# Patient Record
Sex: FEMALE
[De-identification: names, ages, dates, MRNs, and addresses within clinical notes are randomized per-mention and may not be internally consistent; named-entity substitution may affect disease eponyms.]

---

## 2021-11-04 PROBLEM — Z00.00 ENCOUNTER FOR PREVENTIVE HEALTH EXAMINATION: Status: ACTIVE | Noted: 2021-11-04

## 2021-11-24 PROBLEM — Z00.00 ENCOUNTER FOR PREVENTIVE HEALTH EXAMINATION: Status: ACTIVE | Noted: 2021-11-24

## 2021-12-06 ENCOUNTER — TRANSCRIPTION ENCOUNTER (OUTPATIENT)
Age: 61
End: 2021-12-06

## 2021-12-06 ENCOUNTER — APPOINTMENT (OUTPATIENT)
Dept: OTOLARYNGOLOGY | Facility: CLINIC | Age: 61
End: 2021-12-06
Payer: COMMERCIAL

## 2021-12-06 VITALS
DIASTOLIC BLOOD PRESSURE: 67 MMHG | OXYGEN SATURATION: 100 % | SYSTOLIC BLOOD PRESSURE: 106 MMHG | TEMPERATURE: 97.7 F | HEART RATE: 66 BPM | BODY MASS INDEX: 17.72 KG/M2 | HEIGHT: 63 IN | WEIGHT: 100 LBS

## 2021-12-06 DIAGNOSIS — G58.9 MONONEUROPATHY, UNSPECIFIED: ICD-10-CM

## 2021-12-06 DIAGNOSIS — M62.81 MUSCLE WEAKNESS (GENERALIZED): ICD-10-CM

## 2021-12-06 DIAGNOSIS — E55.9 VITAMIN D DEFICIENCY, UNSPECIFIED: ICD-10-CM

## 2021-12-06 DIAGNOSIS — Z87.09 PERSONAL HISTORY OF OTHER DISEASES OF THE RESPIRATORY SYSTEM: ICD-10-CM

## 2021-12-06 DIAGNOSIS — Z80.9 FAMILY HISTORY OF MALIGNANT NEOPLASM, UNSPECIFIED: ICD-10-CM

## 2021-12-06 DIAGNOSIS — Z78.9 OTHER SPECIFIED HEALTH STATUS: ICD-10-CM

## 2021-12-06 DIAGNOSIS — E87.1 HYPO-OSMOLALITY AND HYPONATREMIA: ICD-10-CM

## 2021-12-06 DIAGNOSIS — Z86.39 PERSONAL HISTORY OF OTHER ENDOCRINE, NUTRITIONAL AND METABOLIC DISEASE: ICD-10-CM

## 2021-12-06 DIAGNOSIS — Z82.49 FAMILY HISTORY OF ISCHEMIC HEART DISEASE AND OTHER DISEASES OF THE CIRCULATORY SYSTEM: ICD-10-CM

## 2021-12-06 DIAGNOSIS — Z83.3 FAMILY HISTORY OF DIABETES MELLITUS: ICD-10-CM

## 2021-12-06 PROCEDURE — 31575 DIAGNOSTIC LARYNGOSCOPY: CPT

## 2021-12-06 PROCEDURE — 99205 OFFICE O/P NEW HI 60 MIN: CPT | Mod: 25

## 2021-12-06 PROCEDURE — 76536 US EXAM OF HEAD AND NECK: CPT

## 2021-12-06 RX ORDER — ESTRADIOL 0.05 MG/D
0.05 PATCH TRANSDERMAL
Refills: 0 | Status: ACTIVE | COMMUNITY

## 2021-12-06 RX ORDER — TESTOSTERONE 25 MG/2.5G
25 GEL TRANSDERMAL
Refills: 0 | Status: ACTIVE | COMMUNITY

## 2021-12-07 LAB
25(OH)D3 SERPL-MCNC: 49 NG/ML
ALBUMIN SERPL ELPH-MCNC: 4.7 G/DL
ALP BLD-CCNC: 75 U/L
ALT SERPL-CCNC: 13 U/L
ANION GAP SERPL CALC-SCNC: 12 MMOL/L
AST SERPL-CCNC: 16 U/L
BILIRUB SERPL-MCNC: 0.2 MG/DL
BUN SERPL-MCNC: 18 MG/DL
CA-I SERPL-SCNC: 1.3 MMOL/L
CALCIUM SERPL-MCNC: 10.2 MG/DL
CALCIUM SERPL-MCNC: 10.2 MG/DL
CHLORIDE SERPL-SCNC: 102 MMOL/L
CO2 SERPL-SCNC: 25 MMOL/L
CREAT SERPL-MCNC: 0.62 MG/DL
GLUCOSE SERPL-MCNC: 77 MG/DL
PARATHYROID HORMONE INTACT: 70 PG/ML
POTASSIUM SERPL-SCNC: 4.8 MMOL/L
PROT SERPL-MCNC: 7 G/DL
SODIUM SERPL-SCNC: 139 MMOL/L
T4 FREE SERPL-MCNC: 0.9 NG/DL
TSH SERPL-ACNC: 1.3 UIU/ML

## 2021-12-13 DIAGNOSIS — E83.52 HYPERCALCEMIA: ICD-10-CM

## 2021-12-13 LAB
CAU: 9 MG/DL
CREAT 24H UR-MCNC: 0.8 G/24 H
CREAT 24H UR-MCNC: 0.8 G/24 H
CREAT ?TM UR-MCNC: 51 MG/DL
CREAT ?TM UR-MCNC: 51 MG/DL
PROT ?TM UR-MCNC: 24 HR
PROT ?TM UR-MCNC: 24 HR
SPECIMEN VOL 24H UR: 135 MG/24 H
SPECIMEN VOL 24H UR: 1500 ML
SPECIMEN VOL 24H UR: 1500 ML

## 2022-01-20 ENCOUNTER — APPOINTMENT (OUTPATIENT)
Dept: OTOLARYNGOLOGY | Facility: CLINIC | Age: 62
End: 2022-01-20
Payer: COMMERCIAL

## 2022-01-20 VITALS
RESPIRATION RATE: 17 BRPM | DIASTOLIC BLOOD PRESSURE: 75 MMHG | OXYGEN SATURATION: 97 % | TEMPERATURE: 97.7 F | SYSTOLIC BLOOD PRESSURE: 123 MMHG | BODY MASS INDEX: 17.72 KG/M2 | WEIGHT: 100 LBS | HEART RATE: 77 BPM | HEIGHT: 63 IN

## 2022-01-20 DIAGNOSIS — R79.89 OTHER SPECIFIED ABNORMAL FINDINGS OF BLOOD CHEMISTRY: ICD-10-CM

## 2022-01-20 DIAGNOSIS — N20.0 CALCULUS OF KIDNEY: ICD-10-CM

## 2022-01-20 PROCEDURE — 99215 OFFICE O/P EST HI 40 MIN: CPT | Mod: 25

## 2022-01-24 LAB
25(OH)D3 SERPL-MCNC: 52.3 NG/ML
ALBUMIN SERPL ELPH-MCNC: 5.1 G/DL
ALP BLD-CCNC: 89 U/L
ALT SERPL-CCNC: 22 U/L
ANION GAP SERPL CALC-SCNC: 15 MMOL/L
AST SERPL-CCNC: 24 U/L
BASOPHILS # BLD AUTO: 0.04 K/UL
BASOPHILS NFR BLD AUTO: 0.6 %
BILIRUB SERPL-MCNC: 0.2 MG/DL
BUN SERPL-MCNC: 17 MG/DL
CA-I SERPL-SCNC: 1.4 MMOL/L
CALCIUM SERPL-MCNC: 11 MG/DL
CALCIUM SERPL-MCNC: 11 MG/DL
CHLORIDE SERPL-SCNC: 103 MMOL/L
CO2 SERPL-SCNC: 25 MMOL/L
CREAT SERPL-MCNC: 0.71 MG/DL
EOSINOPHIL # BLD AUTO: 0.06 K/UL
EOSINOPHIL NFR BLD AUTO: 0.9 %
GLUCOSE SERPL-MCNC: 86 MG/DL
HCG SERPL-MCNC: 1 MIU/ML
HCT VFR BLD CALC: 44.3 %
HGB BLD-MCNC: 14 G/DL
IMM GRANULOCYTES NFR BLD AUTO: 0.1 %
INR PPP: 0.89 RATIO
LYMPHOCYTES # BLD AUTO: 1.86 K/UL
LYMPHOCYTES NFR BLD AUTO: 27.6 %
MAN DIFF?: NORMAL
MCHC RBC-ENTMCNC: 30.3 PG
MCHC RBC-ENTMCNC: 31.6 GM/DL
MCV RBC AUTO: 95.9 FL
MONOCYTES # BLD AUTO: 0.6 K/UL
MONOCYTES NFR BLD AUTO: 8.9 %
NEUTROPHILS # BLD AUTO: 4.16 K/UL
NEUTROPHILS NFR BLD AUTO: 61.9 %
PARATHYROID HORMONE INTACT: 46 PG/ML
PLATELET # BLD AUTO: 255 K/UL
POTASSIUM SERPL-SCNC: 5.6 MMOL/L
PROT SERPL-MCNC: 7.7 G/DL
PT BLD: 10.7 SEC
RBC # BLD: 4.62 M/UL
RBC # FLD: 13.4 %
SODIUM SERPL-SCNC: 143 MMOL/L
T3FREE SERPL-MCNC: 3.04 PG/ML
T4 FREE SERPL-MCNC: 1 NG/DL
THYROPEROXIDASE AB SERPL IA-ACNC: <10 IU/ML
TSH SERPL-ACNC: 1.89 UIU/ML
WBC # FLD AUTO: 6.73 K/UL

## 2022-01-30 LAB — SARS-COV-2 N GENE NPH QL NAA+PROBE: NOT DETECTED

## 2022-01-31 RX ORDER — ACETAMINOPHEN AND CODEINE 300; 30 MG/1; MG/1
300-30 TABLET ORAL
Qty: 18 | Refills: 0 | Status: ACTIVE | COMMUNITY
Start: 2022-01-31 | End: 1900-01-01

## 2022-01-31 RX ORDER — AZITHROMYCIN 500 MG/1
500 TABLET, FILM COATED ORAL DAILY
Qty: 1 | Refills: 0 | Status: ACTIVE | COMMUNITY
Start: 2022-01-31 | End: 1900-01-01

## 2022-02-01 ENCOUNTER — TRANSCRIPTION ENCOUNTER (OUTPATIENT)
Age: 62
End: 2022-02-01

## 2022-02-01 VITALS
SYSTOLIC BLOOD PRESSURE: 110 MMHG | HEART RATE: 73 BPM | DIASTOLIC BLOOD PRESSURE: 72 MMHG | TEMPERATURE: 97 F | OXYGEN SATURATION: 100 % | WEIGHT: 106.48 LBS | HEIGHT: 63.5 IN | RESPIRATION RATE: 16 BRPM

## 2022-02-01 NOTE — ASU PREOP CHECKLIST - 1.
K+ elevated in preop labs from1/21/2022 value 5.6  Dr. office informed and spoke with Kristina- no further orders given

## 2022-02-01 NOTE — ASU PATIENT PROFILE, ADULT - NSICDXPASTMEDICALHX_GEN_ALL_CORE_FT
PAST MEDICAL HISTORY:  Thyroid disease      PAST MEDICAL HISTORY:  History of pancreatitis     Kidney stone 2018    Thyroid disease      PAST MEDICAL HISTORY:  Asthma     History of pancreatitis     Kidney stone 2018    Thyroid disease

## 2022-02-01 NOTE — ASU PREOP CHECKLIST - AICD PRESENT
no Spiral Flap Text: The defect edges were debeveled with a #15c scalpel blade.  Given the location of the defect, shape of the defect and the proximity to free margins a spiral flap was deemed most appropriate.  Using a sterile surgical marker, an appropriate rotation flap was drawn incorporating the defect and placing the expected incisions within the relaxed skin tension lines where possible. The area thus outlined was incised deep to adipose tissue with a #15 scalpel blade.  The skin margins were undermined to an appropriate distance in all directions utilizing iris scissors.

## 2022-02-01 NOTE — ASU PATIENT PROFILE, ADULT - FALL HARM RISK - UNIVERSAL INTERVENTIONS
Bed in lowest position, wheels locked, appropriate side rails in place/Call bell, personal items and telephone in reach/Instruct patient to call for assistance before getting out of bed or chair/Non-slip footwear when patient is out of bed/[place] to call system/Physically safe environment - no spills, clutter or unnecessary equipment/Purposeful Proactive Rounding/Room/bathroom lighting operational, light cord in reach

## 2022-02-02 ENCOUNTER — APPOINTMENT (OUTPATIENT)
Dept: OTOLARYNGOLOGY | Facility: HOSPITAL | Age: 62
End: 2022-02-02

## 2022-02-02 ENCOUNTER — RESULT REVIEW (OUTPATIENT)
Age: 62
End: 2022-02-02

## 2022-02-02 ENCOUNTER — TRANSCRIPTION ENCOUNTER (OUTPATIENT)
Age: 62
End: 2022-02-02

## 2022-02-02 ENCOUNTER — OUTPATIENT (OUTPATIENT)
Dept: INPATIENT UNIT | Facility: HOSPITAL | Age: 62
LOS: 1 days | Discharge: HOME | End: 2022-02-02
Payer: COMMERCIAL

## 2022-02-02 VITALS — HEART RATE: 88 BPM | DIASTOLIC BLOOD PRESSURE: 62 MMHG | RESPIRATION RATE: 16 BRPM | SYSTOLIC BLOOD PRESSURE: 105 MMHG

## 2022-02-02 DIAGNOSIS — Z98.890 OTHER SPECIFIED POSTPROCEDURAL STATES: Chronic | ICD-10-CM

## 2022-02-02 DIAGNOSIS — Z90.710 ACQUIRED ABSENCE OF BOTH CERVIX AND UTERUS: Chronic | ICD-10-CM

## 2022-02-02 DIAGNOSIS — D18.00 HEMANGIOMA UNSPECIFIED SITE: Chronic | ICD-10-CM

## 2022-02-02 LAB
ALBUMIN SERPL ELPH-MCNC: 4.2 G/DL — SIGNIFICANT CHANGE UP (ref 3.3–5)
ALP SERPL-CCNC: 72 U/L — SIGNIFICANT CHANGE UP (ref 40–120)
ALT FLD-CCNC: 19 U/L — SIGNIFICANT CHANGE UP (ref 10–45)
ANION GAP SERPL CALC-SCNC: 17 MMOL/L — SIGNIFICANT CHANGE UP (ref 5–17)
AST SERPL-CCNC: 23 U/L — SIGNIFICANT CHANGE UP (ref 10–40)
BILIRUB SERPL-MCNC: 0.2 MG/DL — SIGNIFICANT CHANGE UP (ref 0.2–1.2)
BUN SERPL-MCNC: 12 MG/DL — SIGNIFICANT CHANGE UP (ref 7–23)
CALCIUM SERPL-MCNC: 9.4 MG/DL — SIGNIFICANT CHANGE UP (ref 8.4–10.5)
CHLORIDE SERPL-SCNC: 103 MMOL/L — SIGNIFICANT CHANGE UP (ref 96–108)
CO2 SERPL-SCNC: 21 MMOL/L — LOW (ref 22–31)
CREAT SERPL-MCNC: 0.67 MG/DL — SIGNIFICANT CHANGE UP (ref 0.5–1.3)
GLUCOSE SERPL-MCNC: 183 MG/DL — HIGH (ref 70–99)
ISTAT VENOUS BE: 3 MMOL/L — SIGNIFICANT CHANGE UP (ref -2–3)
ISTAT VENOUS GLUCOSE: 85 MG/DL — SIGNIFICANT CHANGE UP (ref 70–99)
ISTAT VENOUS HCO3: 29 MMOL/L — HIGH (ref 23–28)
ISTAT VENOUS HEMATOCRIT: 36 % — SIGNIFICANT CHANGE UP (ref 34.5–45)
ISTAT VENOUS HEMOGLOBIN: 12.2 GM/DL — SIGNIFICANT CHANGE UP (ref 11.5–15.5)
ISTAT VENOUS IONIZED CALCIUM: 1.33 MMOL/L — HIGH (ref 1.12–1.3)
ISTAT VENOUS PCO2: 46 MMHG — SIGNIFICANT CHANGE UP (ref 41–51)
ISTAT VENOUS PH: 7.4 — SIGNIFICANT CHANGE UP (ref 7.31–7.41)
ISTAT VENOUS PO2: <66 MMHG — LOW (ref 35–40)
ISTAT VENOUS POTASSIUM: 4.9 MMOL/L — SIGNIFICANT CHANGE UP (ref 3.5–5.3)
ISTAT VENOUS SO2: 25 % — SIGNIFICANT CHANGE UP
ISTAT VENOUS SODIUM: 140 MMOL/L — SIGNIFICANT CHANGE UP (ref 135–145)
ISTAT VENOUS TCO2: 30 MMOL/L — SIGNIFICANT CHANGE UP (ref 22–31)
MAGNESIUM SERPL-MCNC: 1.7 MG/DL — SIGNIFICANT CHANGE UP (ref 1.6–2.6)
PHOSPHATE SERPL-MCNC: 2.8 MG/DL — SIGNIFICANT CHANGE UP (ref 2.5–4.5)
POTASSIUM SERPL-MCNC: 4.5 MMOL/L — SIGNIFICANT CHANGE UP (ref 3.5–5.3)
POTASSIUM SERPL-SCNC: 4.5 MMOL/L — SIGNIFICANT CHANGE UP (ref 3.5–5.3)
PROT SERPL-MCNC: 6.3 G/DL — SIGNIFICANT CHANGE UP (ref 6–8.3)
PTH-INTACT IO % DIF SERPL: 18.1 PG/ML — SIGNIFICANT CHANGE UP (ref 15–65)
PTH-INTACT IO % DIF SERPL: 20.8 PG/ML — SIGNIFICANT CHANGE UP (ref 15–65)
PTH-INTACT IO % DIF SERPL: 27.6 PG/ML — SIGNIFICANT CHANGE UP (ref 15–65)
PTH-INTACT IO % DIF SERPL: 65.2 PG/ML — HIGH (ref 15–65)
SODIUM SERPL-SCNC: 141 MMOL/L — SIGNIFICANT CHANGE UP (ref 135–145)

## 2022-02-02 PROCEDURE — 88305 TISSUE EXAM BY PATHOLOGIST: CPT

## 2022-02-02 PROCEDURE — 84100 ASSAY OF PHOSPHORUS: CPT

## 2022-02-02 PROCEDURE — 60500 EXPLORE PARATHYROID GLANDS: CPT

## 2022-02-02 PROCEDURE — 88341 IMHCHEM/IMCYTCHM EA ADD ANTB: CPT

## 2022-02-02 PROCEDURE — 60500 EXPLORE PARATHYROID GLANDS: CPT | Mod: AS

## 2022-02-02 PROCEDURE — 82330 ASSAY OF CALCIUM: CPT

## 2022-02-02 PROCEDURE — 88341 IMHCHEM/IMCYTCHM EA ADD ANTB: CPT | Mod: 26

## 2022-02-02 PROCEDURE — 88305 TISSUE EXAM BY PATHOLOGIST: CPT | Mod: 26

## 2022-02-02 PROCEDURE — C1889: CPT

## 2022-02-02 PROCEDURE — 83735 ASSAY OF MAGNESIUM: CPT

## 2022-02-02 PROCEDURE — 83970 ASSAY OF PARATHORMONE: CPT

## 2022-02-02 PROCEDURE — 85014 HEMATOCRIT: CPT

## 2022-02-02 PROCEDURE — 84295 ASSAY OF SERUM SODIUM: CPT

## 2022-02-02 PROCEDURE — 36415 COLL VENOUS BLD VENIPUNCTURE: CPT

## 2022-02-02 PROCEDURE — 82947 ASSAY GLUCOSE BLOOD QUANT: CPT

## 2022-02-02 PROCEDURE — 88342 IMHCHEM/IMCYTCHM 1ST ANTB: CPT | Mod: 26

## 2022-02-02 PROCEDURE — 84132 ASSAY OF SERUM POTASSIUM: CPT

## 2022-02-02 PROCEDURE — 82803 BLOOD GASES ANY COMBINATION: CPT

## 2022-02-02 PROCEDURE — 80053 COMPREHEN METABOLIC PANEL: CPT

## 2022-02-02 DEVICE — SURGIFLO HEMOSTATIC MATRIX KIT: Type: IMPLANTABLE DEVICE | Status: FUNCTIONAL

## 2022-02-02 RX ORDER — SODIUM CHLORIDE 9 MG/ML
1000 INJECTION, SOLUTION INTRAVENOUS
Refills: 0 | Status: DISCONTINUED | OUTPATIENT
Start: 2022-02-02 | End: 2022-02-02

## 2022-02-02 RX ORDER — ONDANSETRON 8 MG/1
4 TABLET, FILM COATED ORAL ONCE
Refills: 0 | Status: DISCONTINUED | OUTPATIENT
Start: 2022-02-02 | End: 2022-02-02

## 2022-02-02 RX ORDER — HYDROMORPHONE HYDROCHLORIDE 2 MG/ML
0.25 INJECTION INTRAMUSCULAR; INTRAVENOUS; SUBCUTANEOUS ONCE
Refills: 0 | Status: DISCONTINUED | OUTPATIENT
Start: 2022-02-02 | End: 2022-02-02

## 2022-02-02 RX ADMIN — HYDROMORPHONE HYDROCHLORIDE 0.25 MILLIGRAM(S): 2 INJECTION INTRAMUSCULAR; INTRAVENOUS; SUBCUTANEOUS at 12:20

## 2022-02-02 RX ADMIN — HYDROMORPHONE HYDROCHLORIDE 0.25 MILLIGRAM(S): 2 INJECTION INTRAMUSCULAR; INTRAVENOUS; SUBCUTANEOUS at 12:35

## 2022-02-02 NOTE — ASU DISCHARGE PLAN (ADULT/PEDIATRIC) - NS MD DC FALL RISK RISK
For information on Fall & Injury Prevention, visit: [email or web address] OR  [email or web address] OR  https://www.cdc.gov/steadi/patient.html

## 2022-02-02 NOTE — DISCHARGE NOTE PROVIDER - REASON FOR ADMISSION
Hpi Title: Evaluation of Skin Lesions How Severe Are Your Spot(S)?: mild Have Your Spot(S) Been Treated In The Past?: has not been treated Additional History: Pt states she has a lesion on her left lower leg that has been there for over a year. She has had it checked before but wants reassurance. \\n\\nShe is here for a full body skin exam. elective Surgery

## 2022-02-02 NOTE — DISCHARGE NOTE PROVIDER - NSDCACTIVITY_GEN_ALL_CORE
Return to Work/School allowed/Sex allowed/Showering allowed/Walking - Indoors allowed/No heavy lifting/straining/Walking - Outdoors allowed

## 2022-02-02 NOTE — DISCHARGE NOTE PROVIDER - CARE PROVIDER_API CALL
Adam Catalan)  Otolaryngology  [address], [address], [place] [address]  Phone: [phone]  Fax: [phone]  Follow Up Time:

## 2022-02-02 NOTE — DISCHARGE NOTE NURSING/CASE MANAGEMENT/SOCIAL WORK - PATIENT PORTAL LINK FT
You can access the FollowMyHealth Patient Portal offered by [facility] by registering at the following website: [email or web address]. By joining [employer]’s FollowMyHealth portal, you will also be able to view your health information using other applications (apps) compatible with our system.

## 2022-02-02 NOTE — DISCHARGE NOTE PROVIDER - NSDCMRMEDTOKEN_GEN_ALL_CORE_FT
estradiol 0.05 mg/24 hours weekly transdermal film, extended release: 1 patch transdermal once a week  estradiol 0.1 mg/g vaginal cream: vaginal 3 times a week  levothyroxine 37.5 mcg (0.0375 mg)/mL oral solution: 1 tab(s) orally once a day  NP Thyroid 30 mg oral tablet: 1 tab(s) orally once a day  progesterone: 50 milligram(s) orally once a day

## 2022-02-02 NOTE — PACU DISCHARGE NOTE - COMMENTS
Pt met criteria for discharge- s/p parathyroidectomy  with neck exploration- pressure neck dressing CDI- JAYY drain removed at bedside by PA [employer]- pt able to tolerate po intake w. o N/V/D- pt able to ambulate and voids - Discharge instruction explained to pt- Pt verbalizes understanding -pt left unit via wheelchair to main lobby

## 2022-02-02 NOTE — DISCHARGE NOTE PROVIDER - NSDCCPCAREPLAN_GEN_ALL_CORE_FT
PRINCIPAL DISCHARGE DIAGNOSIS  Diagnosis: Parathyroid adenoma  Assessment and Plan of Treatment: Follow up with Dr. Catalan within 1 week. Do not remove stri-strips until seen in office. You may remove Tegaderm and gauze in 24-48 hours.   Please  the medications sent by Dr. catalan's office and take as prescribed. Please return to the ER if experiencing fever, neck swelling, uncontrolled pain, bleeding from incision, numbness in extremities or weekness, chest pain or shortness of breath.

## 2022-02-02 NOTE — DISCHARGE NOTE PROVIDER - HOSPITAL COURSE
62 y/o F with primary hyperparathyroidism now s/p right inferior parathyroidectomy. Procedure was uncomplicated. Post op course was uneventful. Pt tolerated PO intake without N/V, voided adequately and remained hemodynamically stable. At time of discharge pt was stable.

## 2022-02-02 NOTE — BRIEF OPERATIVE NOTE - OPERATION/FINDINGS
Horizontal incision made over anterior neck. Platysma and Strap muscles were divided. Right neck exploration performed, Right inferior parathyroid adenoma noted with the help of Fluoptics florescent and resected. Right superior parathyroid gland noted with Fluoptics and was preserved. Recurrent laryngeal nerve was monitored and preserved. Hemostasis achieved. 10 F channel drain placed. Muscles closed with Vicryl. Skin closed primarily with Prolene.

## 2022-02-07 ENCOUNTER — APPOINTMENT (OUTPATIENT)
Dept: OTOLARYNGOLOGY | Facility: CLINIC | Age: 62
End: 2022-02-07
Payer: COMMERCIAL

## 2022-02-07 VITALS
RESPIRATION RATE: 14 BRPM | SYSTOLIC BLOOD PRESSURE: 113 MMHG | BODY MASS INDEX: 18.07 KG/M2 | HEART RATE: 81 BPM | DIASTOLIC BLOOD PRESSURE: 78 MMHG | TEMPERATURE: 98.1 F | OXYGEN SATURATION: 99 % | HEIGHT: 63 IN | WEIGHT: 102 LBS

## 2022-02-07 DIAGNOSIS — Z87.898 PERSONAL HISTORY OF OTHER SPECIFIED CONDITIONS: ICD-10-CM

## 2022-02-07 PROCEDURE — 99024 POSTOP FOLLOW-UP VISIT: CPT

## 2022-02-07 PROCEDURE — 31575 DIAGNOSTIC LARYNGOSCOPY: CPT | Mod: 58

## 2022-02-08 LAB
25(OH)D3 SERPL-MCNC: 49.8 NG/ML
ALBUMIN SERPL ELPH-MCNC: 4.4 G/DL
ALP BLD-CCNC: 78 U/L
ALT SERPL-CCNC: 21 U/L
ANION GAP SERPL CALC-SCNC: 13 MMOL/L
AST SERPL-CCNC: 18 U/L
BILIRUB SERPL-MCNC: 0.2 MG/DL
BUN SERPL-MCNC: 13 MG/DL
CALCIUM SERPL-MCNC: 9.8 MG/DL
CALCIUM SERPL-MCNC: 9.8 MG/DL
CHLORIDE SERPL-SCNC: 100 MMOL/L
CO2 SERPL-SCNC: 27 MMOL/L
CREAT SERPL-MCNC: 0.67 MG/DL
GLUCOSE SERPL-MCNC: 92 MG/DL
PARATHYROID HORMONE INTACT: 24 PG/ML
PHOSPHATE SERPL-MCNC: 3.2 MG/DL
POTASSIUM SERPL-SCNC: 5 MMOL/L
PROT SERPL-MCNC: 6.7 G/DL
SARS-COV-2 N GENE NPH QL NAA+PROBE: NOT DETECTED
SODIUM SERPL-SCNC: 140 MMOL/L

## 2022-02-11 LAB
SURGICAL PATHOLOGY STUDY: SIGNIFICANT CHANGE UP

## 2022-02-24 LAB
25(OH)D3 SERPL-MCNC: 55.6 NG/ML
ALBUMIN SERPL ELPH-MCNC: 4.4 G/DL
ALP BLD-CCNC: 83 U/L
ALT SERPL-CCNC: 19 U/L
ANION GAP SERPL CALC-SCNC: 14 MMOL/L
AST SERPL-CCNC: 23 U/L
BILIRUB SERPL-MCNC: 0.2 MG/DL
BUN SERPL-MCNC: 14 MG/DL
CALCIUM SERPL-MCNC: 9.6 MG/DL
CALCIUM SERPL-MCNC: 9.6 MG/DL
CHLORIDE SERPL-SCNC: 101 MMOL/L
CO2 SERPL-SCNC: 25 MMOL/L
CREAT SERPL-MCNC: 0.71 MG/DL
GLUCOSE SERPL-MCNC: 77 MG/DL
MAGNESIUM SERPL-MCNC: 1.9 MG/DL
PARATHYROID HORMONE INTACT: 33 PG/ML
POTASSIUM SERPL-SCNC: 4.3 MMOL/L
PROT SERPL-MCNC: 6.6 G/DL
SODIUM SERPL-SCNC: 140 MMOL/L

## 2022-02-28 ENCOUNTER — APPOINTMENT (OUTPATIENT)
Dept: OTOLARYNGOLOGY | Facility: CLINIC | Age: 62
End: 2022-02-28
Payer: COMMERCIAL

## 2022-02-28 VITALS
TEMPERATURE: 98 F | OXYGEN SATURATION: 98 % | SYSTOLIC BLOOD PRESSURE: 105 MMHG | HEIGHT: 63 IN | WEIGHT: 104 LBS | HEART RATE: 79 BPM | BODY MASS INDEX: 18.43 KG/M2 | DIASTOLIC BLOOD PRESSURE: 64 MMHG

## 2022-02-28 DIAGNOSIS — R05.8 OTHER SPECIFIED COUGH: ICD-10-CM

## 2022-02-28 DIAGNOSIS — R49.9 UNSPECIFIED VOICE AND RESONANCE DISORDER: ICD-10-CM

## 2022-02-28 DIAGNOSIS — Z87.898 PERSONAL HISTORY OF OTHER SPECIFIED CONDITIONS: ICD-10-CM

## 2022-02-28 PROCEDURE — 99024 POSTOP FOLLOW-UP VISIT: CPT

## 2022-02-28 RX ORDER — BENZONATATE 100 MG/1
100 CAPSULE ORAL 3 TIMES DAILY
Qty: 30 | Refills: 0 | Status: ACTIVE | COMMUNITY
Start: 2022-02-28 | End: 1900-01-01

## 2022-05-23 ENCOUNTER — APPOINTMENT (OUTPATIENT)
Dept: OTOLARYNGOLOGY | Facility: CLINIC | Age: 62
End: 2022-05-23
Payer: COMMERCIAL

## 2022-05-23 VITALS
SYSTOLIC BLOOD PRESSURE: 123 MMHG | BODY MASS INDEX: 18.55 KG/M2 | RESPIRATION RATE: 14 BRPM | HEART RATE: 67 BPM | DIASTOLIC BLOOD PRESSURE: 71 MMHG | WEIGHT: 106 LBS | HEIGHT: 63.5 IN | OXYGEN SATURATION: 100 % | TEMPERATURE: 98.1 F

## 2022-05-23 DIAGNOSIS — K21.9 ACUTE LARYNGITIS: ICD-10-CM

## 2022-05-23 DIAGNOSIS — D35.1 BENIGN NEOPLASM OF PARATHYROID GLAND: ICD-10-CM

## 2022-05-23 DIAGNOSIS — E03.9 HYPOTHYROIDISM, UNSPECIFIED: ICD-10-CM

## 2022-05-23 DIAGNOSIS — E89.2 POSTPROCEDURAL HYPOPARATHYROIDISM: ICD-10-CM

## 2022-05-23 DIAGNOSIS — K21.9 GASTRO-ESOPHAGEAL REFLUX DISEASE W/OUT ESOPHAGITIS: ICD-10-CM

## 2022-05-23 DIAGNOSIS — R68.89 OTHER GENERAL SYMPTOMS AND SIGNS: ICD-10-CM

## 2022-05-23 DIAGNOSIS — J04.0 ACUTE LARYNGITIS: ICD-10-CM

## 2022-05-23 DIAGNOSIS — E06.3 AUTOIMMUNE THYROIDITIS: ICD-10-CM

## 2022-05-23 DIAGNOSIS — E21.0 PRIMARY HYPERPARATHYROIDISM: ICD-10-CM

## 2022-05-23 PROCEDURE — 99214 OFFICE O/P EST MOD 30 MIN: CPT | Mod: 25

## 2022-05-23 RX ORDER — AZELASTINE HYDROCHLORIDE 137 UG/1
0.1 SPRAY, METERED NASAL
Qty: 30 | Refills: 0 | Status: ACTIVE | COMMUNITY
Start: 2022-03-31

## 2022-05-23 RX ORDER — ESTRADIOL 0.05 MG/D
0.05 PATCH, EXTENDED RELEASE TRANSDERMAL
Qty: 8 | Refills: 0 | Status: ACTIVE | COMMUNITY
Start: 2022-03-30

## 2022-05-23 RX ORDER — FOSFOMYCIN TROMETHAMINE 3 G/1
3 POWDER ORAL
Qty: 1 | Refills: 0 | Status: ACTIVE | COMMUNITY
Start: 2022-04-04

## 2022-05-23 RX ORDER — VALACYCLOVIR 500 MG/1
500 TABLET, FILM COATED ORAL
Qty: 30 | Refills: 0 | Status: ACTIVE | COMMUNITY
Start: 2022-04-12

## 2022-05-23 RX ORDER — TERCONAZOLE 4 MG/G
0.4 CREAM VAGINAL
Qty: 45 | Refills: 0 | Status: ACTIVE | COMMUNITY
Start: 2022-04-01

## 2022-05-23 RX ORDER — TERCONAZOLE 8 MG/G
0.8 CREAM VAGINAL
Qty: 20 | Refills: 0 | Status: ACTIVE | COMMUNITY
Start: 2022-05-01

## 2022-05-23 RX ORDER — ALBUTEROL SULFATE 90 UG/1
108 (90 BASE) INHALANT RESPIRATORY (INHALATION)
Qty: 18 | Refills: 0 | Status: ACTIVE | COMMUNITY
Start: 2022-03-31

## 2022-05-23 RX ORDER — HYDROCODONE BITARTRATE AND ACETAMINOPHEN 10; 325 MG/1; MG/1
10-325 TABLET ORAL
Qty: 10 | Refills: 0 | Status: ACTIVE | COMMUNITY
Start: 2022-03-31

## 2022-05-23 RX ORDER — ATORVASTATIN CALCIUM 10 MG/1
10 TABLET, FILM COATED ORAL
Qty: 15 | Refills: 0 | Status: ACTIVE | COMMUNITY
Start: 2022-03-28

## 2022-05-23 RX ORDER — SODIUM PICOSULFATE, MAGNESIUM OXIDE, AND ANHYDROUS CITRIC ACID 10; 3.5; 12 MG/160ML; G/160ML; G/160ML
10-3.5-12 MG-GM LIQUID ORAL
Qty: 320 | Refills: 0 | Status: ACTIVE | COMMUNITY
Start: 2022-03-11

## 2022-05-23 RX ORDER — BETAMETHASONE DIPROPIONATE 0.5 MG/G
0.05 CREAM TOPICAL
Qty: 15 | Refills: 0 | Status: ACTIVE | COMMUNITY
Start: 2022-04-08

## 2022-05-23 RX ORDER — HYDROCORTISONE ACETATE, PRAMOXINE HCL 2.5; 1 G/100G; G/100G
2.5-1 CREAM TOPICAL
Qty: 30 | Refills: 0 | Status: ACTIVE | COMMUNITY
Start: 2022-03-07

## 2022-05-23 RX ORDER — SULFAMETHOXAZOLE AND TRIMETHOPRIM 800; 160 MG/1; MG/1
800-160 TABLET ORAL
Qty: 14 | Refills: 0 | Status: ACTIVE | COMMUNITY
Start: 2022-04-04

## 2023-11-09 RX ORDER — THYROID 120 MG
1 TABLET ORAL
Qty: 0 | Refills: 0 | DISCHARGE

## 2023-11-09 RX ORDER — ESTRADIOL 4 UG/1
0 INSERT VAGINAL
Qty: 0 | Refills: 0 | DISCHARGE

## 2023-11-09 RX ORDER — LEVOTHYROXINE SODIUM 125 MCG
1 TABLET ORAL
Qty: 0 | Refills: 0 | DISCHARGE

## 2023-11-09 RX ORDER — PROGESTERONE 200 MG/1
50 CAPSULE, LIQUID FILLED ORAL
Qty: 0 | Refills: 0 | DISCHARGE

## 2025-03-03 NOTE — ASU PREOP CHECKLIST - AS TEMP SITE
External Speciality Oncologist referral entered per protocol for follow up care with Dr. Dg Lehman. This provider is at [facility].    tympanic

## (undated) DEVICE — STAPLER SKIN PROXIMATE

## (undated) DEVICE — SUT SILK 2-0 30" PSL

## (undated) DEVICE — DRAPE FLUID WARMER 44 X 66"

## (undated) DEVICE — BLADE SCALPEL SAFETY #15 WITH PLASTIC GREEN HANDLE

## (undated) DEVICE — DRSG TEGADERM 4X4.75"

## (undated) DEVICE — DRSG MASTISOL

## (undated) DEVICE — VENODYNE/SCD SLEEVE CALF MEDIUM

## (undated) DEVICE — POSITIONER PATIENT SAFETY STRAP 3X60"

## (undated) DEVICE — DRAPE EENT SPLIT SHEET LARGE

## (undated) DEVICE — SPONGE ENDO PEANUT 5MM

## (undated) DEVICE — NDL HYPO REGULAR BEVEL 25G X 1.5" (BLUE)

## (undated) DEVICE — SUT VICRYL 5-0 18" P-3 UNDYED

## (undated) DEVICE — SYR LUER LOK 3CC

## (undated) DEVICE — DRSG STERISTRIPS 0.25 X 4"

## (undated) DEVICE — DRSG TEGADERM 2.5X3"

## (undated) DEVICE — SAFETY PIN 1.5" MED

## (undated) DEVICE — PROBE PRASS SLIM MONOPOLAR STIMULATOR

## (undated) DEVICE — SUT PROLENE 4-0 18" P-3

## (undated) DEVICE — WARMING BLANKET LOWER ADULT

## (undated) DEVICE — SUT SILK 2-0 12-18"

## (undated) DEVICE — GLV 7 PROTEXIS (WHITE)

## (undated) DEVICE — DRAPE TOWEL BLUE 17" X 24"

## (undated) DEVICE — TUBING SUCTION 20FT

## (undated) DEVICE — DRSG CURITY GAUZE SPONGE 4 X 4" 12-PLY

## (undated) DEVICE — SUT SILK 3-0 18" TIES

## (undated) DEVICE — DRAPE MAGNETIC INSTRUMENT MEDIUM